# Patient Record
Sex: MALE
[De-identification: names, ages, dates, MRNs, and addresses within clinical notes are randomized per-mention and may not be internally consistent; named-entity substitution may affect disease eponyms.]

---

## 2017-05-08 ENCOUNTER — RX ONLY (OUTPATIENT)
Age: 67
Setting detail: RX ONLY
End: 2017-05-08

## 2023-10-23 ENCOUNTER — APPOINTMENT (RX ONLY)
Dept: URBAN - METROPOLITAN AREA CLINIC 166 | Facility: CLINIC | Age: 73
Setting detail: DERMATOLOGY
End: 2023-10-23

## 2023-10-23 DIAGNOSIS — L85.3 XEROSIS CUTIS: ICD-10-CM

## 2023-10-23 DIAGNOSIS — L82.1 OTHER SEBORRHEIC KERATOSIS: ICD-10-CM

## 2023-10-23 PROCEDURE — ? TREATMENT REGIMEN

## 2023-10-23 PROCEDURE — ? COUNSELING

## 2023-10-23 PROCEDURE — ? DIAGNOSIS COMMENT

## 2023-10-23 PROCEDURE — 99203 OFFICE O/P NEW LOW 30 MIN: CPT

## 2023-10-23 ASSESSMENT — LOCATION ZONE DERM
LOCATION ZONE: SCALP
LOCATION ZONE: ARM
LOCATION ZONE: TRUNK

## 2023-10-23 ASSESSMENT — LOCATION SIMPLE DESCRIPTION DERM
LOCATION SIMPLE: SCALP
LOCATION SIMPLE: RIGHT UPPER BACK
LOCATION SIMPLE: RIGHT FOREARM

## 2023-10-23 ASSESSMENT — LOCATION DETAILED DESCRIPTION DERM
LOCATION DETAILED: RIGHT SUPERIOR PARIETAL SCALP
LOCATION DETAILED: RIGHT VENTRAL PROXIMAL FOREARM
LOCATION DETAILED: LEFT SUPERIOR PARIETAL SCALP
LOCATION DETAILED: RIGHT SUPERIOR MEDIAL UPPER BACK

## 2023-10-23 NOTE — PROCEDURE: COUNSELING
Detail Level: Detailed
Detail Level: Generalized
Moisturizer Recommendations: Vanicream, Cetaphil, CeraVe

## 2024-12-03 ENCOUNTER — APPOINTMENT (RX ONLY)
Dept: URBAN - METROPOLITAN AREA CLINIC 166 | Facility: CLINIC | Age: 74
Setting detail: DERMATOLOGY
End: 2024-12-03

## 2024-12-03 DIAGNOSIS — L57.8 OTHER SKIN CHANGES DUE TO CHRONIC EXPOSURE TO NONIONIZING RADIATION: ICD-10-CM

## 2024-12-03 DIAGNOSIS — L57.0 ACTINIC KERATOSIS: ICD-10-CM

## 2024-12-03 DIAGNOSIS — L82.1 OTHER SEBORRHEIC KERATOSIS: ICD-10-CM

## 2024-12-03 PROCEDURE — ? COUNSELING

## 2024-12-03 PROCEDURE — ? OTHER

## 2024-12-03 PROCEDURE — 99214 OFFICE O/P EST MOD 30 MIN: CPT

## 2024-12-03 PROCEDURE — ? PRESCRIPTION

## 2024-12-03 PROCEDURE — ? TREATMENT REGIMEN

## 2024-12-03 RX ORDER — FLUOROURACIL 5 MG/G
CREAM TOPICAL QHS
Qty: 40 | Refills: 0 | Status: ERX | COMMUNITY
Start: 2024-12-03

## 2024-12-03 RX ADMIN — FLUOROURACIL: 5 CREAM TOPICAL at 00:00

## 2024-12-03 ASSESSMENT — LOCATION ZONE DERM
LOCATION ZONE: NOSE
LOCATION ZONE: SCALP
LOCATION ZONE: FACE

## 2024-12-03 ASSESSMENT — LOCATION DETAILED DESCRIPTION DERM
LOCATION DETAILED: LEFT CENTRAL MALAR CHEEK
LOCATION DETAILED: LEFT CENTRAL PARIETAL SCALP
LOCATION DETAILED: MID-OCCIPITAL SCALP
LOCATION DETAILED: LEFT MEDIAL FOREHEAD
LOCATION DETAILED: RIGHT CENTRAL MALAR CHEEK
LOCATION DETAILED: NASAL DORSUM

## 2024-12-03 ASSESSMENT — LOCATION SIMPLE DESCRIPTION DERM
LOCATION SIMPLE: RIGHT CHEEK
LOCATION SIMPLE: SCALP
LOCATION SIMPLE: NOSE
LOCATION SIMPLE: LEFT CHEEK
LOCATION SIMPLE: LEFT FOREHEAD
LOCATION SIMPLE: POSTERIOR SCALP

## 2024-12-03 NOTE — PROCEDURE: TREATMENT REGIMEN
Detail Level: Zone
Initiate Treatment: fluorouracil 5 % topical cream QHS\\nApply a thin layer to the cheeks, nose, and forehead nightly for 3 weeks. Wash hands after use and keep out of eyes. Keep away from children and pets.

## 2024-12-03 NOTE — PROCEDURE: OTHER
Other (Free Text): Lesion of concern on L scalp
Render Risk Assessment In Note?: no
Note Text (......Xxx Chief Complaint.): This diagnosis correlates with the
Detail Level: Zone